# Patient Record
Sex: MALE | Race: BLACK OR AFRICAN AMERICAN | Employment: FULL TIME | URBAN - METROPOLITAN AREA
[De-identification: names, ages, dates, MRNs, and addresses within clinical notes are randomized per-mention and may not be internally consistent; named-entity substitution may affect disease eponyms.]

---

## 2019-02-09 ENCOUNTER — HOSPITAL ENCOUNTER (EMERGENCY)
Age: 24
Discharge: HOME | End: 2019-02-09
Attending: INTERNAL MEDICINE
Payer: SELF-PAY

## 2019-02-09 VITALS
HEART RATE: 65 BPM | WEIGHT: 283.31 LBS | HEIGHT: 74 IN | SYSTOLIC BLOOD PRESSURE: 133 MMHG | DIASTOLIC BLOOD PRESSURE: 81 MMHG | OXYGEN SATURATION: 100 % | TEMPERATURE: 98.2 F | BODY MASS INDEX: 36.36 KG/M2 | RESPIRATION RATE: 16 BRPM

## 2019-02-09 DIAGNOSIS — J02.9 SORE THROAT: Primary | ICD-10-CM

## 2019-02-09 LAB — DEPRECATED S PYO AG THROAT QL EIA: NEGATIVE

## 2019-02-09 PROCEDURE — 99283 EMERGENCY DEPT VISIT LOW MDM: CPT

## 2019-02-09 PROCEDURE — 87147 CULTURE TYPE IMMUNOLOGIC: CPT

## 2019-02-09 PROCEDURE — 87880 STREP A ASSAY W/OPTIC: CPT

## 2019-02-09 PROCEDURE — 74011250637 HC RX REV CODE- 250/637: Performed by: INTERNAL MEDICINE

## 2019-02-09 PROCEDURE — 87070 CULTURE OTHR SPECIMN AEROBIC: CPT

## 2019-02-09 RX ORDER — ACETAMINOPHEN 325 MG/1
650 TABLET ORAL
Status: COMPLETED | OUTPATIENT
Start: 2019-02-09 | End: 2019-02-09

## 2019-02-09 RX ORDER — GUAIFENESIN 100 MG/5ML
200 SOLUTION ORAL
Status: COMPLETED | OUTPATIENT
Start: 2019-02-09 | End: 2019-02-09

## 2019-02-09 RX ADMIN — GUAIFENESIN 200 MG: 200 SOLUTION ORAL at 23:07

## 2019-02-09 RX ADMIN — ACETAMINOPHEN 650 MG: 325 TABLET, FILM COATED ORAL at 23:07

## 2019-02-10 NOTE — ED NOTES
Pt presents to ED ambulatory complaining of sore throat x5 days. Throat appears irritated w/o exudate. Pt is alert and oriented x 4, RR even and unlabored, skin is warm and dry. Assessment completed and pt updated on plan of care. Safety measures in place, call light within reach. Emergency Department Nursing Plan of Care The Nursing Plan of Care is developed from the Nursing assessment and Emergency Department Attending provider initial evaluation. The plan of care may be reviewed in the ED Provider note. The Plan of Care was developed with the following considerations:  
Patient / Family readiness to learn indicated by:verbalized understanding Persons(s) to be included in education: patient Barriers to Learning/Limitations:No 
 
Signed Issa Hogan   
2/9/2019   11:11 PM

## 2019-02-10 NOTE — DISCHARGE INSTRUCTIONS

## 2019-02-10 NOTE — ED NOTES

## 2019-02-10 NOTE — ED PROVIDER NOTES
EMERGENCY DEPARTMENT HISTORY AND PHYSICAL EXAM 
 
 
Date: 2/9/2019 Patient Name: Radha Fatima History of Presenting Illness Chief Complaint Patient presents with  Sore Throat History Provided By: Patient HPI: Radha Fatima, 26 y.o. male with no significant PMHx, presents ambulatory to the ED with cc of new onset dry cough x 2 days with associated sore throat and CP. Pt states that his throat has become sore from excessively coughing throughout the day. He notes that the development of CP prompted ED visit, but he states that he believes it is from his cough. Pt states that coughing exacerbates his sore throat and CP. He specifically denies any fever, chills, N/V/D, or constipation. Social Hx: -tobacco, -EtOH, -Illicit Drugs There are no other complaints, changes, or physical findings at this time. PCP: Jh Galo MD 
 
No current facility-administered medications on file prior to encounter. No current outpatient medications on file prior to encounter. Past History Past Medical History: 
History reviewed. No pertinent past medical history. Past Surgical History: 
History reviewed. No pertinent surgical history. Family History: 
History reviewed. No pertinent family history. Social History: 
Social History Tobacco Use  Smoking status: Never Smoker  Smokeless tobacco: Never Used Substance Use Topics  Alcohol use: Yes Frequency: Never Comment: occasionally  Drug use: No  
 
 
Allergies: 
No Known Allergies Review of Systems Review of Systems Constitutional: Negative. Negative for activity change, appetite change, chills, fatigue, fever and unexpected weight change. HENT: Positive for sore throat. Negative for congestion, hearing loss, rhinorrhea, sneezing and voice change. Eyes: Negative. Negative for pain and visual disturbance. Respiratory: Positive for cough.  Negative for apnea, choking, chest tightness and shortness of breath. Cardiovascular: Positive for chest pain. Negative for palpitations. Gastrointestinal: Negative. Negative for abdominal distention, abdominal pain, blood in stool, diarrhea, nausea and vomiting. Genitourinary: Negative. Negative for difficulty urinating, flank pain, frequency and urgency. No discharge Musculoskeletal: Negative. Negative for arthralgias, back pain, myalgias and neck stiffness. Skin: Negative. Negative for color change and rash. Neurological: Negative. Negative for dizziness, seizures, syncope, speech difficulty, weakness, numbness and headaches. Hematological: Negative for adenopathy. Psychiatric/Behavioral: Negative. Negative for agitation, behavioral problems, dysphoric mood and suicidal ideas. The patient is not nervous/anxious. Physical Exam  
Physical Exam  
Constitutional: He is oriented to person, place, and time. He appears well-developed and well-nourished. No distress. Resting comfortably, no distress HENT:  
Head: Normocephalic and atraumatic. Mouth/Throat: Oropharynx is clear and moist. No oropharyngeal exudate. Eyes: Conjunctivae and EOM are normal. Pupils are equal, round, and reactive to light. Right eye exhibits no discharge. Left eye exhibits no discharge. Neck: Normal range of motion. Neck supple. Cardiovascular: Normal rate, regular rhythm and intact distal pulses. Exam reveals no gallop and no friction rub. No murmur heard. Pulmonary/Chest: Effort normal and breath sounds normal. No respiratory distress. He has no wheezes. He has no rales. He exhibits no tenderness. Abdominal: Soft. Bowel sounds are normal. He exhibits no distension and no mass. There is no tenderness. There is no rebound and no guarding. Musculoskeletal: Normal range of motion. He exhibits no edema. Lymphadenopathy:  
  He has no cervical adenopathy. Neurological: He is alert and oriented to person, place, and time.  No cranial nerve deficit. Coordination normal.  
Skin: Skin is warm and dry. No rash noted. No erythema. Psychiatric: He has a normal mood and affect. Nursing note and vitals reviewed. Diagnostic Study Results Labs - Recent Results (from the past 12 hour(s)) STREP AG SCREEN, GROUP A Collection Time: 02/09/19 11:09 PM  
Result Value Ref Range Group A Strep Ag ID NEGATIVE  NEG Medical Decision Making I am the first provider for this patient. I reviewed the vital signs, available nursing notes, past medical history, past surgical history, family history and social history. Vital Signs-Reviewed the patient's vital signs. Patient Vitals for the past 12 hrs: 
 Temp Pulse Resp BP SpO2  
02/09/19 2238 98.2 °F (36.8 °C) 65 16 133/81 100 % Pulse Oximetry Analysis - 100% on room air Cardiac Monitor:  
Rate: 65 bpm 
Rhythm: Normal Sinus Rhythm Records Reviewed: Nursing Notes Provider Notes (Medical Decision Making): DDx: URI, viral syndrome, bronchitis, cough ED Course:  
Initial assessment performed. The patients presenting problems have been discussed, and they are in agreement with the care plan formulated and outlined with them. I have encouraged them to ask questions as they arise throughout their visit. Critical Care Time:  
0 minutes. Disposition: 
DISCHARGE NOTE: 
11:27 PM 
The patient is ready for discharge. The patients signs, symptoms, diagnosis, and instructions for discharge have been discussed and the pt has conveyed their understanding. The patient is to follow up as recommended with PCP or return to the ER should their symptoms worsen. Plan has been discussed and patient has conveyed their agreement. PLAN: 
1. Discharge home. There are no discharge medications for this patient. 2.  
Follow-up Information Follow up With Specialties Details Why Contact Info  Other, MD Jh  In 2 days If symptoms worsen Patient can only remember the practice name and not the physician Return to ED if worse Diagnosis Clinical Impression: 1. Sore throat Attestations: This note is prepared by Petros Martin, acting as Scribe for Reshma Goff MD. 
 
Reshma Goff MD: The scribe's documentation has been prepared under my direction and personally reviewed by me in its entirety. I confirm that the note above accurately reflects all work, treatment, procedures, and medical decision making performed by me. This note will not be viewable in [address]

## 2019-02-12 LAB
BACTERIA SPEC CULT: ABNORMAL
BACTERIA SPEC CULT: ABNORMAL
SERVICE CMNT-IMP: ABNORMAL